# Patient Record
Sex: FEMALE | Race: AMERICAN INDIAN OR ALASKA NATIVE
[De-identification: names, ages, dates, MRNs, and addresses within clinical notes are randomized per-mention and may not be internally consistent; named-entity substitution may affect disease eponyms.]

---

## 2018-06-23 ENCOUNTER — HOSPITAL ENCOUNTER (EMERGENCY)
Dept: HOSPITAL 5 - ED | Age: 28
LOS: 1 days | Discharge: HOME | End: 2018-06-24
Payer: SELF-PAY

## 2018-06-23 VITALS — DIASTOLIC BLOOD PRESSURE: 97 MMHG | SYSTOLIC BLOOD PRESSURE: 149 MMHG

## 2018-06-23 DIAGNOSIS — J45.909: ICD-10-CM

## 2018-06-23 DIAGNOSIS — G43.909: ICD-10-CM

## 2018-06-23 DIAGNOSIS — H92.02: Primary | ICD-10-CM

## 2018-06-23 PROCEDURE — 99282 EMERGENCY DEPT VISIT SF MDM: CPT

## 2018-06-24 NOTE — EMERGENCY DEPARTMENT REPORT
<COLUMBA SAUER - Last Filed: 18 00:46>





ED ENT HPI





- General


Chief complaint: Earache


Stated complaint: LEFT EAR PAIN


Time Seen by Provider: 18 00:46


Source: patient


Mode of arrival: Ambulatory


Limitations: No Limitations





- History of Present Illness


Initial comments: 





27-year-old -American female comes in complaining of left ear pain since 

yesterday.  Patient reports that she is using home remedy of (white vinegar and 

alcohol) after doing that her pain has worsened.  Patient does report that she 

has in a prescription for amoxicillin twice a day but has not picked up the 

prescription from [facility].  Patient denies any fever or chills no nausea no 

vomiting no ear drainage no hearing loss.  She has no known drug allergies 

currently takes no medications on a daily basis.


MD complaint: ear pain


-: days(s) (1)


Location: L ear


Severity scale (0 -10): 10


Quality: sharp, constant


Consistency: constant


Improves with: none


Worsens with: other (using home remedies such as white vinegar and alcohol)


Associated Symptoms: denies: fever, cough, gum swelling, toothache, pain with 

swallowing, sore throat, tinnitus, hearing loss, discharge from ear, rhinorrhea





- Related Data


 Home Medications











 Medication  Instructions  Recorded  Confirmed  Last Taken


 


Beclomethasone Dipropionate [Qvar 2 inhalation IH BID 16 10:00





80MCG]    2


 


Labetalol [Normodyne TAB] 300 mg PO BID 16








 Previous Rx's











 Medication  Instructions  Recorded  Last Taken  Type


 


Albuterol Sulfate [Ventolin HFA] 2 puff IH Q4H PRN #1 hfa.aer.ad 12/25/15 05/03/

16 02:00 Rx





   2 puff 


 


Ciprofloxacin/Hydrocortisone 3 drop OT BID 7 Days #1 bottle 18 Unknown Rx





[Ciprofloxacin HC OTIC]    


 


Ibuprofen [Motrin 800 MG tab] 800 mg PO TID PRN #30 tablet 18 Unknown Rx











 Allergies











Allergy/AdvReac Type Severity Reaction Status Date / Time


 


No Known Allergies Allergy   Verified 16 16:33














ED Dental HPI





- General


Chief complaint: Earache


Stated complaint: LEFT EAR PAIN


Time Seen by Provider: 18 00:46


Source: patient


Mode of arrival: Ambulatory


Limitations: No Limitations





- Related Data


 Home Medications











 Medication  Instructions  Recorded  Confirmed  Last Taken


 


Beclomethasone Dipropionate [Qvar 2 inhalation IH BID 16 10:00





80MCG]    2


 


Labetalol [Normodyne TAB] 300 mg PO BID 16








 Previous Rx's











 Medication  Instructions  Recorded  Last Taken  Type


 


Albuterol Sulfate [Ventolin HFA] 2 puff IH Q4H PRN #1 hfa.aer.ad 12/25/15 05/03/

16 02:00 Rx





   2 puff 


 


Ciprofloxacin/Hydrocortisone 3 drop OT BID 7 Days #1 bottle 18 Unknown Rx





[Ciprofloxacin HC OTIC]    


 


Ibuprofen [Motrin 800 MG tab] 800 mg PO TID PRN #30 tablet 18 Unknown Rx











 Allergies











Allergy/AdvReac Type Severity Reaction Status Date / Time


 


No Known Allergies Allergy   Verified 16 16:33














ED Review of Systems


ROS: 


Stated complaint: LEFT EAR PAIN


Other details as noted in HPI





Comment: All other systems reviewed and negative


ENT: ear pain





ED Past Medical Hx





- Past Medical History


Hx Hypertension: No


Hx Congestive Heart Failure: No


Hx Diabetes: No


Hx Deep Vein Thrombosis: No


Hx Renal Disease: Yes


Hx Sickle Cell Disease: No


Hx Headaches / Migraines: Yes


Hx Seizures: No


Hx Asthma: Yes


Hx COPD: No


Hx HIV: No


Additional medical history: reactive airway disease.  Preclampsia with last 

preg.   Ab0





- Surgical History


Additional Surgical History: left ankle--ligament repair





- Social History


Smoking Status: Never Smoker


Substance Use Type: None





- Medications


Home Medications: 


 Home Medications











 Medication  Instructions  Recorded  Confirmed  Last Taken  Type


 


Albuterol Sulfate [Ventolin HFA] 2 puff IH Q4H PRN #1 hfa.aer.ad 12/25/15 05/22/

16 05/03/16 02:00 Rx





    2 puff 


 


Beclomethasone Dipropionate [Qvar 2 inhalation IH BID 16 10:00 History





80MCG]    2 


 


Labetalol [Normodyne TAB] 300 mg PO BID 16 History


 


Ciprofloxacin/Hydrocortisone 3 drop OT BID 7 Days #1 bottle 18  Unknown Rx





[Ciprofloxacin HC OTIC]     


 


Ibuprofen [Motrin 800 MG tab] 800 mg PO TID PRN #30 tablet 18  Unknown Rx














ED Physical Exam





- General


Limitations: No Limitations


General appearance: alert, in no apparent distress





- Head


Head exam: Present: atraumatic, normocephalic





- Eye


Eye exam: Present: PERRL, EOMI





- Expanded ENT Exam


  ** Expanded


TM/Canal exam: Erythema: Left TM, Effusion: Left TM, Loss of Landmarks: Left TM

, Canal Tenderness: Left TM





- Neck


Neck exam: Present: normal inspection, full ROM.  Absent: lymphadenopathy





- Respiratory


Respiratory exam: Present: normal lung sounds bilaterally.  Absent: respiratory 

distress





- Cardiovascular


Cardiovascular Exam: Present: regular rate, normal rhythm.  Absent: systolic 

murmur, diastolic murmur, rubs, gallop





ED Course





 Vital Signs











  18





  23:28 23:52 00:49


 


Temperature 98.9 F 98.9 F 


 


Pulse Rate 83 81 


 


Respiratory 18 18 18





Rate   


 


Blood Pressure 149/97 149/97 


 


O2 Sat by Pulse 98 98 





Oximetry   














ED Medical Decision Making





- Medical Decision Making





Patient's been evaluated by this provider fast track.


We'll give patient ibuprofen 800 mg now for pain management.


Discussed the patient to fill the prescription for amoxicillin from [facility].


We'll prescribe patient ear drops for external otitis.


Critical care attestation.: 


If time is entered above; I have spent that time in minutes in the direct care 

of this critically ill patient, excluding procedure time.








ED Disposition


Disposition: DC-01 TO HOME OR SELFCARE


Is pt being admited?: No


Does the pt Need Aspirin: No


Condition: Stable


Instructions:  Otitis Externa (ED), Otitis Media (ED)


Additional Instructions: 


Start her amoxicillin prescription that she will  from [facility] as well as 

used eyedrops antibiotics for your left ear.  If her symptoms persist or gets 

worse please follow up with her primary care provider.


Prescriptions: 


Ciprofloxacin/Hydrocortisone [Ciprofloxacin HC OTIC] 3 drop OT BID 7 Days #1 

bottle


Ibuprofen [Motrin 800 MG tab] 800 mg PO TID PRN #30 tablet


 PRN Reason: Pain


Referrals: 


PRIMARY CARE,MD [Primary Care Provider] - 3-5 Days


[facility] [Provider Group] - 3-5 Days


Forms:  Accompanied Note, Work/School Release Form(ED)





<JOE BURT - Last Filed: 18 11:33>





ED Medical Decision Making





- Medical Decision Making


I was available for consultations at all times during the patient stay. I did 

not personally see and was not involved in the care of the patient.


Garrick Burt MD

## 2018-09-23 ENCOUNTER — HOSPITAL ENCOUNTER (EMERGENCY)
Dept: HOSPITAL 5 - ED | Age: 28
Discharge: HOME | End: 2018-09-23
Payer: SELF-PAY

## 2018-09-23 VITALS — SYSTOLIC BLOOD PRESSURE: 164 MMHG | DIASTOLIC BLOOD PRESSURE: 96 MMHG

## 2018-09-23 DIAGNOSIS — Z33.1: ICD-10-CM

## 2018-09-23 DIAGNOSIS — R30.0: Primary | ICD-10-CM

## 2018-09-23 DIAGNOSIS — R03.0: ICD-10-CM

## 2018-09-23 PROCEDURE — 99283 EMERGENCY DEPT VISIT LOW MDM: CPT

## 2018-09-23 PROCEDURE — 82962 GLUCOSE BLOOD TEST: CPT

## 2018-09-23 PROCEDURE — 81025 URINE PREGNANCY TEST: CPT

## 2018-09-23 NOTE — EMERGENCY DEPARTMENT REPORT
Chief Complaint: Nausea/Vomiting/Diarrhea


Stated Complaint: SICK FEELING/HEAD ACHE


Time Seen by Provider: 09/23/18 15:12





- HPI


History of Present Illness: 





27-year-old AA female presents to the emergency department with complaint of a 

dull persistent posterior headache.  She denies any vision change, vomiting, 

fever or neurological deficits.  Patient does present with some elevated blood 

pressure and says she only has a history of hypertension during pregnancy with 

preeclampsia.  She denies being currently pregnant but says it is possible.  

She has not taken anything for her symptoms.  Her sedation.  Currently it is 4 

out of 10 in intensity.





- ROS


Review of Systems: 


Positive for headache, nausea without vomiting


Negative for vomiting, fever, photophobia or vision change








- Exam


Vital Signs: 


 Vital Signs











  09/23/18





  12:11


 


Temperature 98.8 F


 


Pulse Rate 84


 


Respiratory 16





Rate 


 


Blood Pressure 172/102


 


O2 Sat by Pulse 98





Oximetry 











Physical Exam: 


Pupils equal and reactive to light bilaterally.  Extraocular motion intact.  No 

nystagmus.  Heart and lung sounds are normal to auscultation.  Patient is awake 

and oriented.





MSE screening note: 


Focused history and physical exam performed.


Due to findings the following was ordered:





Patient will be given some Tylenol to try and treat her headache.  She will 

have a pregnancy test.  If the pain does not resolve, will consider imaging.  We

'll continue to monitor the blood pressure.





ED Disposition for MSE


Condition: Stable


Referrals: 


PRIMARY CARE,MD [Primary Care Provider] - 3-5 Days

## 2018-09-23 NOTE — EMERGENCY DEPARTMENT REPORT
ED Dysuria HPI





- HPI


Chief Complaint: Nausea/Vomiting/Diarrhea


Stated Complaint: SICK FEELING/HEAD ACHE SUSPECTS SHE MAY BE PREGNANT


Time Seen by Provider: 18 15:12


Duration: 3 Days


Severity: None


Symptoms: Dysuria: No, Frequency: No, Suprapubic Pain: No, Flank Pain: No, Fever

: No, Hematuria: No, Abdominal Pain: No, Previous UTI's: No


Other History: NAUSEA AND HEADACHE





ED Review of Systems


ROS: 


Stated complaint: SICK FEELING/HEAD ACHE


Other details as noted in HPI





Comment: All other systems reviewed and negative


Constitutional: denies: chills, fever


Eyes: denies: eye pain


ENT: denies: ear pain


Respiratory: denies: cough


Cardiovascular: denies: chest pain


Endocrine: see HPI.  denies: excessive sweating


Gastrointestinal: nausea.  denies: abdominal pain, vomiting


Genitourinary: denies: urgency, dysuria, frequency, hematuria, discharge


Musculoskeletal: denies: back pain


Skin: denies: rash, lesions


Neurological: headache.  denies: weakness


Psychiatric: denies: anxiety, depression


Hematological/Lymphatic: denies: easy bleeding





ED Past Medical Hx





- Past Medical History


Previous Medical History?: Yes


Hx Hypertension: No


Hx Congestive Heart Failure: No


Hx Diabetes: No


Hx Deep Vein Thrombosis: No


Hx Renal Disease: Yes


Hx Sickle Cell Disease: No


Hx Headaches / Migraines: Yes


Hx Seizures: No


Hx Asthma: Yes


Hx COPD: No


Hx HIV: No


Additional medical history: reactive airway disease.  Preclampsia with last 

preg.   Ab0





- Surgical History


Past Surgical History?: Yes


Additional Surgical History: left ankle--ligament repair





- Social History


Smoking Status: Never Smoker


Substance Use Type: None





- Medications


Home Medications: 


 Home Medications











 Medication  Instructions  Recorded  Confirmed  Last Taken  Type


 


Albuterol Sulfate [Ventolin HFA] 2 puff IH Q4H PRN #1 hfa.aer.ad 12/25/15 05/22/

16 05/03/16 02:00 Rx





    2 puff 


 


Beclomethasone Dipropionate [Qvar 2 inhalation IH BID 16 10:00 History





80MCG]    2 


 


Labetalol [Normodyne TAB] 300 mg PO BID 16 History


 


Ciprofloxacin/Hydrocortisone 3 drop OT BID 7 Days #1 bottle 18  Unknown Rx





[Ciprofloxacin HC OTIC]     


 


Ibuprofen [Motrin 800 MG tab] 800 mg PO TID PRN #30 tablet 18  Unknown Rx














Dysuria Exam





- Exam


General: 


Vital signs noted. No distress. Alert and acting appropriately.





Exam: Yes Moist Mucous Membranes, No CVA Tenderness, No Abdominal Tenderness, 

No Rigidity or Guarding


Exam: ABC INTACT.  BP SLIGHTLY INC.  OBESE AND ANXIOUS.  PREGNANT.  LMP 8-15.  

NO VAG BLEED OR DC.  "HAD A FEELING".   1 CHILD  OF HYPOPLASTIC LV.  

GOING THRU DIVORCE.  TO SEE OB THIS WEEK


Labs: 


 Lab Results











  18 Range/Units





  12:15 Unknown 


 


POC Glucose  81   ()  


 


Urine HCG, Qual   Positive A  (Negative)  














ED Course


 Vital Signs











  18





  12:11


 


Temperature 98.8 F


 


Pulse Rate 84


 


Respiratory 16





Rate 


 


Blood Pressure 172/102


 


O2 Sat by Pulse 98





Oximetry 














- Reevaluation(s)


Reevaluation #1: 





18 16:17


PT GIVEN RESULTS OF UPREG


FATHER AT BEDSIDE 


WILL SEE OB THIS WEEK





ED Medical Decision Making





- Medical Decision Making





NO ABD PAIN OR BLEEDING





- Differential Diagnosis


POS PREG TEST- CONFIRMED HER SUSP


Critical care attestation.: 


If time is entered above; I have spent that time in minutes in the direct care 

of this critically ill patient, excluding procedure time.








ED Disposition


Clinical Impression: 


 Pregnant state, incidental, Elevated blood pressure reading





Disposition: DC-01 TO HOME OR SELFCARE


Is pt being admited?: No


Does the pt Need Aspirin: No


Condition: Stable


Instructions:  Pregnancy (ED)


Additional Instructions: 


FOLLOW UP WITH OBGYN THIS WEEK





MONITOR BLOOD PRESSURE


Referrals: 


GISELA PETERSON MD [Staff Physician] - 3-5 Days


PRIMARY CARE,MD [Primary Care Provider] - 3-5 Days


Time of Disposition: 16:18

## 2018-10-01 ENCOUNTER — HOSPITAL ENCOUNTER (EMERGENCY)
Dept: HOSPITAL 5 - ED | Age: 28
Discharge: HOME | End: 2018-10-01
Payer: MEDICAID

## 2018-10-01 VITALS — SYSTOLIC BLOOD PRESSURE: 148 MMHG | DIASTOLIC BLOOD PRESSURE: 97 MMHG

## 2018-10-01 DIAGNOSIS — J45.909: ICD-10-CM

## 2018-10-01 DIAGNOSIS — O20.0: Primary | ICD-10-CM

## 2018-10-01 DIAGNOSIS — Z3A.01: ICD-10-CM

## 2018-10-01 DIAGNOSIS — G43.909: ICD-10-CM

## 2018-10-01 LAB
ALBUMIN SERPL-MCNC: 4.1 G/DL (ref 3.9–5)
ALT SERPL-CCNC: 22 UNITS/L (ref 7–56)
BASOPHILS # (AUTO): 0 K/MM3 (ref 0–0.1)
BASOPHILS NFR BLD AUTO: 0.1 % (ref 0–1.8)
BILIRUB UR QL STRIP: (no result)
BLOOD UR QL VISUAL: (no result)
BUN SERPL-MCNC: 6 MG/DL (ref 7–17)
BUN/CREAT SERPL: 10 %
CALCIUM SERPL-MCNC: 9.5 MG/DL (ref 8.4–10.2)
EOSINOPHIL # BLD AUTO: 0.1 K/MM3 (ref 0–0.4)
EOSINOPHIL NFR BLD AUTO: 0.6 % (ref 0–4.3)
HCT VFR BLD CALC: 39 % (ref 30.3–42.9)
HEMOLYSIS INDEX: 5
HGB BLD-MCNC: 13.4 GM/DL (ref 10.1–14.3)
LYMPHOCYTES # BLD AUTO: 2.5 K/MM3 (ref 1.2–5.4)
LYMPHOCYTES NFR BLD AUTO: 21.8 % (ref 13.4–35)
MCH RBC QN AUTO: 30 PG (ref 28–32)
MCHC RBC AUTO-ENTMCNC: 34 % (ref 30–34)
MCV RBC AUTO: 88 FL (ref 79–97)
MONOCYTES # (AUTO): 0.7 K/MM3 (ref 0–0.8)
MONOCYTES % (AUTO): 5.8 % (ref 0–7.3)
MUCOUS THREADS #/AREA URNS HPF: (no result) /HPF
PH UR STRIP: 6 [PH] (ref 5–7)
PLATELET # BLD: 217 K/MM3 (ref 140–440)
RBC # BLD AUTO: 4.45 M/MM3 (ref 3.65–5.03)
RBC #/AREA URNS HPF: < 1 /HPF (ref 0–6)
UROBILINOGEN UR-MCNC: < 2 MG/DL (ref ?–2)
WBC #/AREA URNS HPF: < 1 /HPF (ref 0–6)

## 2018-10-01 PROCEDURE — 85025 COMPLETE CBC W/AUTO DIFF WBC: CPT

## 2018-10-01 PROCEDURE — 81001 URINALYSIS AUTO W/SCOPE: CPT

## 2018-10-01 PROCEDURE — 84702 CHORIONIC GONADOTROPIN TEST: CPT

## 2018-10-01 PROCEDURE — 76817 TRANSVAGINAL US OBSTETRIC: CPT

## 2018-10-01 PROCEDURE — 76801 OB US < 14 WKS SINGLE FETUS: CPT

## 2018-10-01 PROCEDURE — 99284 EMERGENCY DEPT VISIT MOD MDM: CPT

## 2018-10-01 PROCEDURE — 80053 COMPREHEN METABOLIC PANEL: CPT

## 2018-10-01 PROCEDURE — 36415 COLL VENOUS BLD VENIPUNCTURE: CPT

## 2018-10-01 NOTE — ULTRASOUND REPORT
FINAL REPORT



EXAM:  US OB TRANSVAGINAL



HISTORY:  ABDOMINAL PAIN IN PREGNANCY 



TECHNIQUE:  Grayscale and color doppler ultrasound imaging of the

pelvis was performed transabdominally and transvaginally.



PRIORS:  None.



FINDINGS:  

Uterus: The uterus is homogeneous in echogenicity without focal

mass. The uterus measures 10.0 x 6.5 x 5.5 centimeters. An

intrauterine gestational sac, yolk sac and embryonic pole were

seen. Embryonic cardiac activity was detected at 96-99 beats per

minute. Estimated gestational age is 6 weeks and 4 days with an

MARKUS of 05/23/2019. Mean sac diameter is 22.1 millimeters.

Crown-rump length is 2.5 millimeters. No evidence of subchorionic

hemorrhage. 



Ovaries: A 1.6 centimeter right ovarian cyst is seen. A 1.4

centimeter left ovarian cyst is seen. Normal flow is seen to the

ovaries. The right ovary measures 4.6 x 2.3 x 3.0 centimeters.

The left ovary measures 4.2 x 1.7 x 2.6 centimeters. 



Free fluid: None. 



IMPRESSION:  

Live intrauterine pregnancy measuring at 6 weeks and 4 days

gestational age with an MARKUS of 05/23/2019.

## 2018-10-01 NOTE — ULTRASOUND REPORT
FINAL REPORT



EXAM:  US OB &lt; = 14 WEEKS FETUS



HISTORY:  ABDOMINAL PAIN IN PREGNANCY 



TECHNIQUE:  Grayscale and color doppler ultrasound imaging of the

pelvis was performed  transabdominally and transvaginally.



PRIORS:  None.



FINDINGS:  

Uterus: The uterus is homogeneous in echogenicity without focal

mass. The uterus measures 10.0 x 6.5 x 5.5 centimeters. An

intrauterine gestational sac, yolk sac and embryonic pole were

seen. Embryonic cardiac activity was detected at 96-99 beats per

minute. Estimated gestational age is 6 weeks and 4 days with an

MARKUS of 05/23/2019. Mean sac diameter is 22.1 millimeters.

Crown-rump length is 2.5 millimeters. No evidence of subchorionic

hemorrhage. 



Ovaries: A 1.6 centimeter right ovarian cyst is seen. A 1.4

centimeter left ovarian cyst is seen. Normal flow is seen to the

ovaries. The right ovary measures 4.6 x 2.3 x 3.0 centimeters.

The left ovary measures 4.2 x 1.7 x 2.6 centimeters. 



Free fluid: None. 



IMPRESSION:  

Live intrauterine pregnancy measuring at 6 weeks and 4 days

gestational age with an MARKUS of 05/23/2019.

## 2018-10-01 NOTE — EMERGENCY DEPARTMENT REPORT
ED General Adult HPI





- General


Chief complaint: Vaginal Bleeding


Stated complaint: POSSIBLE MISCARRIAGE


Time Seen by Provider: 10/01/18 10:36


Source: patient


Mode of arrival: Ambulatory


Limitations: No Limitations





- History of Present Illness


Initial comments: 





Patient presents to emergency department with a chief complaint of lower 

abdominal cramping times one week.  Patient states she recently took a home 

pregnancy test and it was positive.  Patient states today she had some clots in 

the toilet at the use in the bathroom but denies any [place] vaginal bleeding.  

Patient states that this is her third pregnancy and has 1 live birth.


-: Gradual


Location: abdomen


Radiation: non-radiation


Severity scale (0 -10): 1


Consistency: constant


Improves with: none


Worsens with: none


Associated Symptoms: denies other symptoms


Treatments Prior to Arrival: none





- Related Data


 Home Medications











 Medication  Instructions  Recorded  Confirmed  Last Taken


 


Labetalol [Normodyne TAB] 300 mg PO BID 16








 Previous Rx's











 Medication  Instructions  Recorded  Last Taken  Type


 


Albuterol Sulfate [Ventolin HFA] 2 puff IH Q4H PRN #1 hfa.aer.ad 12/25/15 05/03/

16 02:00 Rx





   2 puff 


 


Ciprofloxacin/Hydrocortisone 3 drop OT BID 7 Days #1 bottle 18 Unknown Rx





[Ciprofloxacin HC OTIC]    


 


Ibuprofen [Motrin 800 MG tab] 800 mg PO TID PRN #30 tablet 18 Unknown Rx











 Allergies











Allergy/AdvReac Type Severity Reaction Status Date / Time


 


No Known Allergies Allergy   Verified 16 16:33














ED Review of Systems


ROS: 


Stated complaint: POSSIBLE MISCARRIAGE


Other details as noted in HPI





Comment: All other systems reviewed and negative


Constitutional: denies: chills, fever


Eyes: denies: eye pain, eye discharge, vision change


ENT: denies: ear pain, throat pain


Respiratory: denies: cough, shortness of breath, wheezing


Cardiovascular: denies: chest pain, palpitations


Endocrine: no symptoms reported


Gastrointestinal: denies: abdominal pain, nausea, diarrhea


Genitourinary: denies: urgency, dysuria, discharge


Musculoskeletal: denies: back pain, joint swelling, arthralgia


Skin: denies: rash, lesions


Neurological: denies: headache, weakness, paresthesias


Psychiatric: denies: anxiety, depression


Hematological/Lymphatic: denies: easy bleeding, easy bruising





ED Past Medical Hx





- Past Medical History


Hx Hypertension: No


Hx Congestive Heart Failure: No


Hx Diabetes: No


Hx Deep Vein Thrombosis: No


Hx Renal Disease: Yes


Hx Sickle Cell Disease: No


Hx Headaches / Migraines: Yes


Hx Seizures: No


Hx Asthma: Yes


Hx COPD: No


Hx HIV: No


Additional medical history: reactive airway disease.  Preclampsia with last 

preg.   Ab0





- Surgical History


Additional Surgical History: left ankle--ligament repair





- Social History


Smoking Status: Never Smoker


Substance Use Type: None





- Medications


Home Medications: 


 Home Medications











 Medication  Instructions  Recorded  Confirmed  Last Taken  Type


 


Albuterol Sulfate [Ventolin HFA] 2 puff IH Q4H PRN #1 hfa.aer.ad 12/25/15 05/22/

16 05/03/16 02:00 Rx





    2 puff 


 


Labetalol [Normodyne TAB] 300 mg PO BID 16 History


 


Ciprofloxacin/Hydrocortisone 3 drop OT BID 7 Days #1 bottle 18  Unknown Rx





[Ciprofloxacin HC OTIC]     


 


Ibuprofen [Motrin 800 MG tab] 800 mg PO TID PRN #30 tablet 18  Unknown Rx














ED Physical Exam





- General


Limitations: No Limitations


General appearance: alert, in no apparent distress





- Head


Head exam: Present: atraumatic, normocephalic





- Eye


Eye exam: Present: normal appearance





- ENT


ENT exam: Present: mucous membranes moist





- Neck


Neck exam: Present: normal inspection





- Respiratory


Respiratory exam: Present: normal lung sounds bilaterally.  Absent: respiratory 

distress, wheezes, rales, rhonchi





- Cardiovascular


Cardiovascular Exam: Present: regular rate, normal rhythm.  Absent: systolic 

murmur, diastolic murmur, rubs, gallop





- GI/Abdominal


GI/Abdominal exam: Present: soft, normal bowel sounds.  Absent: distended, 

tenderness





- Rectal


Rectal exam: Present: deferred





- 


External exam: Present: other (deferred)


Speculum exam: Present: other (deferred)


Bi-manual exam: Present: other (deferred)





- Extremities Exam


Extremities exam: Present: normal inspection





- Back Exam


Back exam: Present: normal inspection





- Neurological Exam


Neurological exam: Present: alert, oriented X3, CN II-XII intact.  Absent: 

motor sensory deficit





- Psychiatric


Psychiatric exam: Present: normal affect, normal mood





- Skin


Skin exam: Present: warm, dry, intact, normal color.  Absent: rash





ED Course


 Vital Signs











  10/01/18





  10:25


 


Temperature 99.6 F


 


Pulse Rate 16 L


 


Respiratory 18





Rate 


 


Blood Pressure 148/97


 


O2 Sat by Pulse 98





Oximetry 














ED Medical Decision Making





- Lab Data


Result diagrams: 


 10/01/18 11:42





 10/01/18 11:00





- Medical Decision Making





Discussed results with patient


Critical care attestation.: 


If time is entered above; I have spent that time in minutes in the direct care 

of this critically ill patient, excluding procedure time.








ED Disposition


Clinical Impression: 


 Threatened miscarriage in early pregnancy





Disposition: DC-01 TO HOME OR SELFCARE


Is pt being admited?: No


Does the pt Need Aspirin: No


Condition: Stable


Instructions:  Threatened Miscarriage (ED)


Additional Instructions: 


return if worse


Referrals: 


PRIMARY CARE,MD [Primary Care Provider] - 3-5 Days


LIZZETTE STEVENSON MD [Staff Physician] - 3-5 Days


Time of Disposition: 14:25

## 2019-03-14 ENCOUNTER — HOSPITAL ENCOUNTER (OUTPATIENT)
Dept: HOSPITAL 5 - TRG | Age: 29
Discharge: HOME | End: 2019-03-14
Attending: OBSTETRICS & GYNECOLOGY
Payer: MEDICAID

## 2019-03-14 VITALS — DIASTOLIC BLOOD PRESSURE: 90 MMHG | SYSTOLIC BLOOD PRESSURE: 145 MMHG

## 2019-03-14 DIAGNOSIS — J45.909: ICD-10-CM

## 2019-03-14 DIAGNOSIS — O46.8X3: ICD-10-CM

## 2019-03-14 DIAGNOSIS — O99.513: ICD-10-CM

## 2019-03-14 DIAGNOSIS — R04.0: ICD-10-CM

## 2019-03-14 DIAGNOSIS — O47.03: Primary | ICD-10-CM

## 2019-03-14 DIAGNOSIS — Z3A.30: ICD-10-CM

## 2019-03-14 DIAGNOSIS — O13.3: ICD-10-CM

## 2019-03-14 LAB
ALT SERPL-CCNC: 19 UNITS/L (ref 7–56)
BENZODIAZEPINES SCREEN,URINE: (no result)
BILIRUB UR QL STRIP: (no result)
BLOOD UR QL VISUAL: (no result)
HCT VFR BLD CALC: 35.2 % (ref 30.3–42.9)
HGB BLD-MCNC: 11.9 GM/DL (ref 10.1–14.3)
MCHC RBC AUTO-ENTMCNC: 34 % (ref 30–34)
MCV RBC AUTO: 90 FL (ref 79–97)
METHADONE SCREEN,URINE: (no result)
MUCOUS THREADS #/AREA URNS HPF: (no result) /HPF
OPIATE SCREEN,URINE: (no result)
PH UR STRIP: 6 [PH] (ref 5–7)
PLATELET # BLD: 233 K/MM3 (ref 140–440)
RBC # BLD AUTO: 3.9 M/MM3 (ref 3.65–5.03)
RBC #/AREA URNS HPF: 6 /HPF (ref 0–6)
URATE SERPL-MCNC: 3.8 MG/DL (ref 3.5–7.6)
UROBILINOGEN UR-MCNC: < 2 MG/DL (ref ?–2)
WBC #/AREA URNS HPF: < 1 /HPF (ref 0–6)

## 2019-03-14 PROCEDURE — 82565 ASSAY OF CREATININE: CPT

## 2019-03-14 PROCEDURE — 85027 COMPLETE CBC AUTOMATED: CPT

## 2019-03-14 PROCEDURE — 59025 FETAL NON-STRESS TEST: CPT

## 2019-03-14 PROCEDURE — 87806 HIV AG W/HIV1&2 ANTB W/OPTIC: CPT

## 2019-03-14 PROCEDURE — 86803 HEPATITIS C AB TEST: CPT

## 2019-03-14 PROCEDURE — 84550 ASSAY OF BLOOD/URIC ACID: CPT

## 2019-03-14 PROCEDURE — 83615 LACTATE (LD) (LDH) ENZYME: CPT

## 2019-03-14 PROCEDURE — 80307 DRUG TEST PRSMV CHEM ANLYZR: CPT

## 2019-03-14 PROCEDURE — 86706 HEP B SURFACE ANTIBODY: CPT

## 2019-03-14 PROCEDURE — 86762 RUBELLA ANTIBODY: CPT

## 2019-03-14 PROCEDURE — 86592 SYPHILIS TEST NON-TREP QUAL: CPT

## 2019-03-14 PROCEDURE — 84460 ALANINE AMINO (ALT) (SGPT): CPT

## 2019-03-14 PROCEDURE — 84450 TRANSFERASE (AST) (SGOT): CPT

## 2019-03-14 PROCEDURE — 36415 COLL VENOUS BLD VENIPUNCTURE: CPT

## 2019-03-14 PROCEDURE — 81001 URINALYSIS AUTO W/SCOPE: CPT

## 2019-03-14 NOTE — EVENT NOTE
Date: 03/14/19


pt reports nose bleed has improved (had nasal ablation age 5 for chronic 

nosebleeds.)  Pt reports proteinuria and CHTN during this pregnancy. Pt states 

she received regular care with Jody Del Toro and plans on delivery at [facility]. 

Will consult Dr. Kelley and attempt to get records.

## 2019-03-14 NOTE — EVENT NOTE
<HECTOR GRIMES - Last Filed: 03/14/19 09:25>


Date: 03/14/19


 Pt denies HA/ visual changes or epigastric pain.  reviewed pt's status with dr. Hinds, plan for c/s home. pt to continue Labetalaol 100mg BID (rx given) 

and call [facility] for appointment. 








<GISELE HINDS - Last Filed: 03/14/19 13:17>





Plan for D/C home

## 2021-01-27 ENCOUNTER — HOSPITAL ENCOUNTER (EMERGENCY)
Dept: HOSPITAL 5 - ED | Age: 31
Discharge: HOME | End: 2021-01-27
Payer: MEDICAID

## 2021-01-27 VITALS — DIASTOLIC BLOOD PRESSURE: 82 MMHG | SYSTOLIC BLOOD PRESSURE: 128 MMHG

## 2021-01-27 DIAGNOSIS — Z79.899: ICD-10-CM

## 2021-01-27 DIAGNOSIS — E11.9: ICD-10-CM

## 2021-01-27 DIAGNOSIS — G43.909: ICD-10-CM

## 2021-01-27 DIAGNOSIS — Z98.890: ICD-10-CM

## 2021-01-27 DIAGNOSIS — J45.909: ICD-10-CM

## 2021-01-27 DIAGNOSIS — I10: ICD-10-CM

## 2021-01-27 DIAGNOSIS — J12.82: Primary | ICD-10-CM

## 2021-01-27 LAB
ALBUMIN SERPL-MCNC: 3.7 G/DL (ref 3.9–5)
ALT SERPL-CCNC: 46 UNITS/L (ref 7–56)
APTT BLD: 28.6 SEC. (ref 24.2–36.6)
BASOPHILS # (AUTO): 0 K/MM3 (ref 0–0.1)
BASOPHILS NFR BLD AUTO: 0.1 % (ref 0–1.8)
BILIRUB DIRECT SERPL-MCNC: < 0.2 MG/DL (ref 0–0.2)
BUN SERPL-MCNC: 7 MG/DL (ref 7–17)
BUN/CREAT SERPL: 9 %
CALCIUM SERPL-MCNC: 9 MG/DL (ref 8.4–10.2)
EOSINOPHIL # BLD AUTO: 0 K/MM3 (ref 0–0.4)
EOSINOPHIL NFR BLD AUTO: 0 % (ref 0–4.3)
HCT VFR BLD CALC: 43.7 % (ref 30.3–42.9)
HEMOLYSIS INDEX: 6
HGB BLD-MCNC: 14.7 GM/DL (ref 10.1–14.3)
INR PPP: 0.94 (ref 0.87–1.13)
LYMPHOCYTES # BLD AUTO: 1.7 K/MM3 (ref 1.2–5.4)
LYMPHOCYTES NFR BLD AUTO: 25 % (ref 13.4–35)
MCHC RBC AUTO-ENTMCNC: 34 % (ref 30–34)
MCV RBC AUTO: 90 FL (ref 79–97)
MONOCYTES # (AUTO): 0.4 K/MM3 (ref 0–0.8)
MONOCYTES % (AUTO): 6.1 % (ref 0–7.3)
PLATELET # BLD: 185 K/MM3 (ref 140–440)
RBC # BLD AUTO: 4.86 M/MM3 (ref 3.65–5.03)

## 2021-01-27 PROCEDURE — 99285 EMERGENCY DEPT VISIT HI MDM: CPT

## 2021-01-27 PROCEDURE — 36415 COLL VENOUS BLD VENIPUNCTURE: CPT

## 2021-01-27 PROCEDURE — 71275 CT ANGIOGRAPHY CHEST: CPT

## 2021-01-27 PROCEDURE — 85730 THROMBOPLASTIN TIME PARTIAL: CPT

## 2021-01-27 PROCEDURE — 71045 X-RAY EXAM CHEST 1 VIEW: CPT

## 2021-01-27 PROCEDURE — 85379 FIBRIN DEGRADATION QUANT: CPT

## 2021-01-27 PROCEDURE — 80048 BASIC METABOLIC PNL TOTAL CA: CPT

## 2021-01-27 PROCEDURE — 85610 PROTHROMBIN TIME: CPT

## 2021-01-27 PROCEDURE — 85025 COMPLETE CBC W/AUTO DIFF WBC: CPT

## 2021-01-27 PROCEDURE — 80076 HEPATIC FUNCTION PANEL: CPT

## 2021-01-27 PROCEDURE — 84703 CHORIONIC GONADOTROPIN ASSAY: CPT

## 2021-01-27 NOTE — XRAY REPORT
CHEST 1 VIEW 1/27/2021 11:19 AM



INDICATION / CLINICAL INFORMATION: Shortness of breath, COVID+.



COMPARISON: None available.



FINDINGS:



SUPPORT DEVICES: None.



HEART / MEDIASTINUM: No significant abnormality. 



LUNGS / PLEURA: No significant pulmonary or pleural abnormality. No pneumothorax. 



ADDITIONAL FINDINGS: No significant additional findings.



IMPRESSION:

1. No acute findings.



Signer Name: Adán Reinoso MD 

Signed: 1/27/2021 12:21 PM

Workstation Name: [id number]

## 2021-01-27 NOTE — EMERGENCY DEPARTMENT REPORT
ED Shortness of Breath HPI





- General


Stated Complaint: COVID +/SOB


Time Seen by Provider: 21 09:32





- History of Present Illness


Initial Comments: 





30-year-old female, history of asthma, Covid positive, presents to ED with 

shortness of breath.  Patient states her symptoms began 6 days ago on 21, 

including fever, cough, dyspnea.  She went to an urgent care the next day and 

tested positive.  Patient states at the urgent care she was given IV fluids, 

Rocephin, and Decadron.  Patient was given a prescription for azithromycin and 

prednisone.  Patient states she is on her last day of azithromycin.  She reports

she has been using her albuterol at home, however she is having some worsening 

dyspnea on exertion and right-sided chest pain, so EMS was called this morning


MD Complaint: shortness of breath


-: week(s) (1)


Severity: moderate


Consistency: intermittent


Improves With: rest


Worsens With: exertion


Known History Of: asthma


Context: recent URI


Associated Symptoms: fever, cough


Treatments Prior to Arrival: bronchodilator





- Related Data


Home Oxygen Therapy: No


                                Home Medications











 Medication  Instructions  Recorded  Confirmed  Last Taken


 


labetaloL [Labetalol 100mg TAB] 300 mg PO BID 16








                                  Previous Rx's











 Medication  Instructions  Recorded  Last Taken  Type


 


Albuterol Sulfate [Ventolin HFA] 2 puff IH Q4H PRN #1 hfa.aer.ad 12/25/15 

05/03/16 02:00 Rx





   2 puff 


 


Ciprofloxacin/Hydrocortisone 3 drop OT BID 7 Days #1 bottle 18 Unknown Rx





[Ciprofloxacin HC OTIC]    


 


Ibuprofen [Motrin 800 MG tab] 800 mg PO TID PRN #30 tablet 18 Unknown Rx


 


labetaloL [Labetalol 100mg TAB] 100 mg PO BID #60 tablet 19 Unknown Rx











                                    Allergies











Allergy/AdvReac Type Severity Reaction Status Date / Time


 


No Known Allergies Allergy   Verified 16 16:33














ED Review of Systems


ROS: 


Stated complaint: COVID +/SOB


Other details as noted in HPI





Comment: All other systems reviewed and negative


Constitutional: fever


Respiratory: cough, shortness of breath


Cardiovascular: chest pain





ED Past Medical Hx





- Past Medical History


Hx Hypertension: Yes (CHTN)


Hx Congestive Heart Failure: No


Hx Diabetes: Yes (gestational)


Hx Deep Vein Thrombosis: No


Hx Renal Disease: No


Hx Sickle Cell Disease: No


Hx Headaches / Migraines: Yes


Hx Seizures: No


Hx Asthma: Yes (19)


Hx COPD: No


Hx HIV: No


Additional medical history: reactive airway disease.  Preclampsia with last 

preg.   Ab0





- Surgical History


Additional Surgical History: left ankle--ligament repair





- Social History


Smoking Status: Never Smoker





- Medications


Home Medications: 


                                Home Medications











 Medication  Instructions  Recorded  Confirmed  Last Taken  Type


 


Albuterol Sulfate [Ventolin HFA] 2 puff IH Q4H PRN #1 hfa.aer.ad 12/25/15 

05/22/16 05/03/16 02:00 Rx





    2 puff 


 


labetaloL [Labetalol 100mg TAB] 300 mg PO BID 16 History


 


Ciprofloxacin/Hydrocortisone 3 drop OT BID 7 Days #1 bottle 18  Unknown Rx





[Ciprofloxacin HC OTIC]     


 


Ibuprofen [Motrin 800 MG tab] 800 mg PO TID PRN #30 tablet 18  Unknown Rx


 


labetaloL [Labetalol 100mg TAB] 100 mg PO BID #60 tablet 19  Unknown Rx














ED Physical Exam





- General


General appearance: alert, in no apparent distress, obese





- Head


Head exam: Present: atraumatic, normocephalic





- Eye


Eye exam: Present: normal appearance, EOMI





- ENT


ENT exam: Present: mucous membranes moist





- Neck


Neck exam: Present: normal inspection





- Respiratory


Respiratory exam: Present: normal lung sounds bilaterally.  Absent: respiratory 

distress





- Cardiovascular


Cardiovascular Exam: Present: regular rate, normal rhythm





- GI/Abdominal


GI/Abdominal exam: Present: soft.  Absent: distended, tenderness





- Extremities Exam


Extremities exam: Present: normal inspection





- Neurological Exam


Neurological exam: Present: alert, oriented X3





- Psychiatric


Psychiatric exam: Present: normal affect, normal mood





- Skin


Skin exam: Present: warm, dry, intact, normal color





ED Course


                                   Vital Signs











  21





  09:30


 


Temperature 98.8 F


 


Pulse Rate 88


 


Respiratory 22





Rate 


 


Blood Pressure 128/82


 


O2 Sat by Pulse 99





Oximetry 














ED Medical Decision Making





- Lab Data


Result diagrams: 


                                 21 10:12





                                 21 10:12





- Radiology Data


Radiology results: report reviewed, image reviewed





- Medical Decision Making





30-year-old female who tested positive for COVID-19 5 days ago presents to ED 

with shortness of breath.  Patient is not hypoxic here in the ED, does not 

appear to be in any respiratory distress.  CTA chest was done because patient 

did report some right-sided chest pain.  CT shows some evidence, however there 

is no PE present.  Patient has already been given prescription for prednisone 

and azithromycin.  She will be discharged at this time.  She currently is able 

to monitor her O2 sats with her iPhone watch.  Prone positioning advised.  

Return precautions given.








- Differential Diagnosis


COVID-19, pneumonia, PE


Critical care attestation.: 


If time is entered above; I have spent that time in minutes in the direct care 

of this critically ill patient, excluding procedure time.








ED Disposition


Clinical Impression: 


 Pneumonia due to COVID-19 virus





Disposition: DC- TO HOME OR SELFCARE


Is pt being admited?: No


Condition: Stable


Instructions:  COVID-19, Prevent the Spread of COVID-19 if You Are Sick - CDC, 

Bacterial Pneumonia (ED)


Additional Instructions: 


Your oxygen level is normal today.





Return to ER if oxygen drops below 90%.





Remember tp prone (lay on your stomach) as much as possible when you are laying 

down.


Referrals: 


ADRIANNA RUELAS MD [Primary Care Provider] - 3-5 Days


Time of Disposition: 12:51

## 2021-01-27 NOTE — CAT SCAN REPORT
CTA CHEST WITH IV CONTRAST



INDICATION:

Chest pain and shortness of breath.



TECHNIQUE:

Axial CT images were obtained through the chest after injection of 100 mL Omnipaque 350 IV contrast. 
3 plane MIP reconstructions were produced. All CT scans at this location are performed using CT dose 
reduction for ALARA by means of automated exposure control. 



COMPARISON:

None available.



FINDINGS:

Pulmonary Arteries: No pulmonary emboli.



Lungs: There are mild patchy peripheral groundglass opacities.

Trachea and Bronchi:  No significant abnormality.



Heart and Pericardium: No significant abnormality.

Vasculature: No significant abnormality.

Lymphatics: No lymphadenopathy.



Additional Findings: None.



Upper Abdomen: No acute findings.



Skeletal Structures: No acute findings or aggressive bone lesions.



IMPRESSION:

1. No CT evidence for pulmonary embolism. 

2. Mild patchy peripheral groundglass opacities in both lungs likely indicating viral pneumonia.



Signer Name: Adán Reinoso MD 

Signed: 1/27/2021 12:33 PM

Workstation Name: [id number]